# Patient Record
Sex: MALE | Race: WHITE | Employment: FULL TIME | URBAN - METROPOLITAN AREA
[De-identification: names, ages, dates, MRNs, and addresses within clinical notes are randomized per-mention and may not be internally consistent; named-entity substitution may affect disease eponyms.]

---

## 2023-03-25 ENCOUNTER — APPOINTMENT (OUTPATIENT)
Dept: CT IMAGING | Age: 38
End: 2023-03-25
Payer: OTHER MISCELLANEOUS

## 2023-03-25 ENCOUNTER — HOSPITAL ENCOUNTER (EMERGENCY)
Age: 38
Discharge: HOME | End: 2023-03-25
Attending: EMERGENCY MEDICINE
Payer: OTHER MISCELLANEOUS

## 2023-03-25 VITALS
DIASTOLIC BLOOD PRESSURE: 70 MMHG | SYSTOLIC BLOOD PRESSURE: 115 MMHG | RESPIRATION RATE: 17 BRPM | WEIGHT: 256 LBS | HEART RATE: 78 BPM | BODY MASS INDEX: 35.84 KG/M2 | OXYGEN SATURATION: 99 % | HEIGHT: 71 IN | TEMPERATURE: 98.4 F
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
## 2023-03-25 DIAGNOSIS — S09.90XA CLOSED HEAD INJURY, INITIAL ENCOUNTER: ICD-10-CM

## 2023-03-25 PROCEDURE — 70450 CT HEAD/BRAIN W/O DYE: CPT

## 2023-03-25 PROCEDURE — 99284 EMERGENCY DEPT VISIT MOD MDM: CPT

## 2023-03-25 ASSESSMENT — PAIN - FUNCTIONAL ASSESSMENT: PAIN_FUNCTIONAL_ASSESSMENT: 0-10

## 2023-03-25 ASSESSMENT — PAIN SCALES - GENERAL: PAINLEVEL_OUTOF10: 3

## 2023-03-25 ASSESSMENT — PAIN DESCRIPTION - DESCRIPTORS: DESCRIPTORS: ACHING

## 2023-03-25 ASSESSMENT — PAIN DESCRIPTION - LOCATION: LOCATION: HEAD

## 2023-03-25 ASSESSMENT — PAIN DESCRIPTION - PAIN TYPE: TYPE: ACUTE PAIN

## 2023-03-26 NOTE — DISCHARGE INSTRUCTIONS
The CAT scan does not show any signs of intracranial injury. You may take Tylenol and ibuprofen for pain and discomfort. Return for worsening symptoms, nausea, vomiting, weakness or numbness in the extremities.

## 2023-03-26 NOTE — ED NOTES
..Reviewed discharge instructions with patient. Patient verbalized understanding. No distress noted. Ambulatory from department.       Nydia Baumgarten, RN  03/25/23 2200
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
## 2023-03-26 NOTE — ED PROVIDER NOTES
related to that system including errors in grammar, punctuation, and spelling, as well as words and phrases that may be inappropriate.  If there are any questions or concerns please feel free to contact the dictating provider for clarification.)    MD Baylee Willams MD  03/25/23 3862